# Patient Record
Sex: MALE | Race: WHITE | NOT HISPANIC OR LATINO | URBAN - METROPOLITAN AREA
[De-identification: names, ages, dates, MRNs, and addresses within clinical notes are randomized per-mention and may not be internally consistent; named-entity substitution may affect disease eponyms.]

---

## 2017-11-07 DIAGNOSIS — M17.11 PRIMARY OSTEOARTHRITIS OF RIGHT KNEE: Primary | ICD-10-CM

## 2017-11-07 NOTE — TELEPHONE ENCOUNTER
Phone Call:    Who did you talk to? (or) Who did you call? patient   Call Detail/Action: Emailed OPAL forms for [facility] and [facility] to pt -- [email or web address]

## 2017-11-07 NOTE — TELEPHONE ENCOUNTER
APPT INFO    Date /Time: 11/9/17 2pm   Reason for Appt: Rt Knee Possible TKA   Ref Provider/Clinic: Dr. Biggs   Are there internal records? If yes, list: no   Patient Contact (Y/N) & Call Details: Yes - LVM for pt to return call. Need OPAL forms   Action: --     OUTSIDE RECORDS CHECKLIST     CLINIC NAME COMMENTS REC (x) IMG (x)   [facility]      [facility]

## 2017-11-09 ENCOUNTER — PRE VISIT (OUTPATIENT)
Dept: ORTHOPEDICS | Facility: CLINIC | Age: 62
End: 2017-11-09

## 2017-11-09 ENCOUNTER — OFFICE VISIT (OUTPATIENT)
Dept: ORTHOPEDICS | Facility: CLINIC | Age: 62
End: 2017-11-09

## 2017-11-09 VITALS — WEIGHT: 212 LBS | HEIGHT: 72 IN | BODY MASS INDEX: 28.71 KG/M2

## 2017-11-09 DIAGNOSIS — M17.31 POST-TRAUMATIC OSTEOARTHRITIS OF RIGHT KNEE: Primary | ICD-10-CM

## 2017-11-09 RX ORDER — TRIAMCINOLONE ACETONIDE 40 MG/ML
40 INJECTION, SUSPENSION INTRA-ARTICULAR; INTRAMUSCULAR ONCE
Status: ACTIVE | OUTPATIENT
Start: 2017-11-09

## 2017-11-09 RX ORDER — LIDOCAINE HYDROCHLORIDE 10 MG/ML
2 INJECTION, SOLUTION INFILTRATION; PERINEURAL ONCE
Status: ACTIVE | OUTPATIENT
Start: 2017-11-09

## 2017-11-09 NOTE — LETTER
"11/9/2017       RE: Pb Morton  [address] [place] [address]     Dear Colleague,    Thank you for referring your patient, Pb Morton, to the [facility] at [facility]. Please see a copy of my visit note below.    Cc: right knee pain    HPI:  Pb is a known patient of ours who underwent a successful left medial uniarthroplasty in 2007. Unfortunately, over the last few months he has increased his walking and is having medial right knee pain. Rates it a consistent \"6 or 7\". Takes occasional Aleve which \"helps\". Denies mechanical symptoms. Has activity related swelling. No acute injuries. He did have a right knee arthscopy by Dr. Biggs in 2009 that \"helped the mechanical symptoms he was having\" and now it just \"hurts\". It is now at the point where it wakes him up at night.  He wears various types of over the counter braces that help \"keep me moving\".    PMH: Reviewed    ROS: Reviewed    FH/SH: Reviewed    Meds: Reviewed    All: Reviewed    PE:  Pleasant and cooperative male alert and oriented x3. Walks with a mild varus thrust on right lower extremity. Tender to palpate medial joint and nontender to lateral compartment. Minimal patella femoral crepitus without pain. Tracks appropriately. Strength symmetrical to contralateral side. No calf pain. No neuro deficits. Ligament exam is normal. RM -6-120.  Left knee reveals a well healed incision. No effusion. Stable one exam. ROM 0-126.     Xrays show a left medial uniarthroplasty without shifting or lucency or change in position.  Lateral compartment is well preserved. Right knee has end stage, grade IV,  bone on bone medial osteoarthritis with subchondral sclerosis. Full length reveals minimal leg length difference with -4 degrees of varus bilaterally.    Dx:  1. Left knee medial uniarthroplasty 2007  2. End stage right knee medial osteoarthritis, grade IV, bone on bone.    Plan:  1. Do nothing  2. " NSAIDS  3. Injections  4. Bracing  5. Surgery discussed scope, realignment, partial, and total knee replacements.    At this time, we will continue conservative treatments based on patients request. Recommended a cortisone shot today. Informed consent obtained.    Pre Procedure dx: right knee osteoarthritis    Post Procedure dx: right knee osteoarthritis    Procedure: Under sterile technique, the anterior lateral aspect of knee was prepped with chlorahexadine. 1cc of 40 mg of kenalog and 2 cc of lidocaine was injected into the lateral compartment without difficulty. Patient tolerated procedure well without reaction or complication.         Again, thank you for allowing me to participate in the care of your patient.      Sincerely,    IVON Browne CNP

## 2017-11-09 NOTE — MR AVS SNAPSHOT
After Visit Summary   2017    Pb Morton    MRN: [id number]           Patient Information     Date Of Birth          1955        Visit Information        Provider Department      2017 2:00 PM Nuzhat Berry, APRN CNP [facility]        Today's Diagnoses     Post-traumatic osteoarthritis of right knee    -  1       Follow-ups after your visit        Who to contact     Please call your clinic at [phone] to:    Ask questions about your health    Make or cancel appointments    Discuss your medicines    Learn about your test results    Speak to your doctor   If you have compliments or concerns about an experience at your clinic, or if you wish to file a complaint, please contact [facility] Patient Relations at [phone] or email us at [email or web address]         Additional Information About Your Visit        MyChart Information     [employer] is an electronic gateway that provides easy, online access to your medical records. With [employer], you can request a clinic appointment, read your test results, renew a prescription or communicate with your care team.     To sign up for [employer] visit the website at [email or web address]   You will be asked to enter the access code listed below, as well as some personal information. Please follow the directions to create your username and password.     Your access code is: [email or web address]  Expires: 2018  6:30 AM     Your access code will  in 90 days. If you need help or a new code, please contact your [facility] or call [phone] for assistance.        Care EveryWhere ID     This is your Care EveryWhere ID. This could be used by other organizations to access your [facility] medical records  [id number]        Your Vitals Were     Height BMI (Body Mass Index)                1.829 m (6') 28.75 kg/m2           Blood Pressure from Last 3  Encounters:   No data found for BP    Weight from Last 3 Encounters:   11/09/17 96.2 kg (212 lb)              We Performed the Following     Large Joint/Bursa injection and/or drainage - Unilateral (Shoulder, Knee) [20610]        Primary Care Provider    None Specified       No primary provider on file.        Equal Access to Services     MINNA [facility]FREDA : Hadii aad ku hadmargieo Somegali, waaxda luqadaha, qaybta kaalmada jillianyada, shayy lisaamparogenevieve collins . [facility] [phone].    ATENCIÓN: Si habla español, tiene a botello disposición servicios gratuitos de asistencia lingüística. Llame al [phone].    We comply with applicable federal civil rights laws and [place] laws. We do not discriminate on the basis of race, color, national origin, age, disability, sex, sexual orientation, or gender identity.            Thank you!     Thank you for choosing [facility]  for your care. Our goal is always to provide you with excellent care. Hearing back from our patients is one way we can continue to improve our services. Please take a few minutes to complete the written survey that you may receive in the mail after your visit with us. Thank you!             Your Updated Medication List - Protect others around you: Learn how to safely use, store and throw away your medicines at www.disposemymeds.org.          This list is accurate as of: 11/9/17  3:09 PM.  Always use your most recent med list.                   Brand Name Dispense Instructions for use Diagnosis    ALEVE PO           aspirin 81 MG tablet      Take by mouth daily        ATORVASTATIN CALCIUM PO           B-12 1000 MCG Caps           D3 DOTS 2000 UNITS Tbdp   Generic drug:  Cholecalciferol           FISH OIL PO           GLIPIZIDE PO           LISINOPRIL PO           METFORMIN HCL PO

## 2017-11-09 NOTE — LETTER
Date:November 10, 2017      Patient was self referred, no letter generated. Do not send.        [facility] Health Information

## 2017-11-09 NOTE — NURSING NOTE
[address] [address]  Dept: [phone]  ______________________________________________________________________________    Patient: Pb Morton   : 1955   MRN: [id number]   2017    INVASIVE PROCEDURE SAFETY CHECKLIST    Date: 2017   Procedure:Right knee cortisone injection  Patient Name: Pb Morton  MRN: [id number]  YOB: 1955    Action: Complete sections as appropriate. Any discrepancy results in a HARD COPY until resolved.     PRE PROCEDURE:  Patient ID verified with 2 identifiers (name and  or MRN): Yes  Procedure and site verified with patient/designee (when able): Yes  Accurate consent documentation in medical record: Yes  H&P (or appropriate assessment) documented in medical record: NA  H&P must be up to 20 days prior to procedure and updates within 24 hours of procedure as applicable: NA  Relevant diagnostic and radiology test results appropriately labeled and displayed as applicable: Yes  Procedure site(s) marked with provider initials: Yes    TIMEOUT:  Time-Out performed immediately prior to starting procedure, including verbal and active participation of all team members addressing the following:Yes  * Correct patient identify  * Confirmed that the correct side and site are marked  * An accurate procedure consent form  * Agreement on the procedure to be done  * Correct patient position  * Relevant images and results are properly labeled and appropriately displayed  * The need to administer antibiotics or fluids for irrigation purposes during the procedure as applicable   * Safety precautions based on patient history or medication use    DURING PROCEDURE: Verification of correct person, site, and procedures any time the responsibility for care of the patient is transferred to another member of the care team.     The following medication was given:   MEDICATION:  Kenalog 40 mg  ROUTE: intra  articular  SITE: right knee  DOSE: 1ml  LOT #: [id number]  : GL 2ours  EXPIRATION DATE: 03/19  NDC#: 1298-6503-36   Was there drug waste? No    MEDICATION:  Lidocaine without epinephrine  ROUTE: intra articular  SITE: right knee  DOSE: 2ml  LOT #: [id number]  : go2 media  EXPIRATION DATE: 07/21  NDC#: 40987-109-85   Was there drug waste? Yes  Amount of drug waste (mL): 18.  Reason for waste:  Single use vial      Onel Pineda, ATC  November 9, 2017

## 2017-11-09 NOTE — PROGRESS NOTES
"Cc: right knee pain    HPI:  Pb is a known patient of ours who underwent a successful left medial uniarthroplasty in 2007. Unfortunately, over the last few months he has increased his walking and is having medial right knee pain. Rates it a consistent \"6 or 7\". Takes occasional Aleve which \"helps\". Denies mechanical symptoms. Has activity related swelling. No acute injuries. He did have a right knee arthscopy by Dr. Biggs in 2009 that \"helped the mechanical symptoms he was having\" and now it just \"hurts\". It is now at the point where it wakes him up at night.  He wears various types of over the counter braces that help \"keep me moving\".    PMH: Reviewed    ROS: Reviewed    FH/SH: Reviewed    Meds: Reviewed    All: Reviewed    PE:  Pleasant and cooperative male alert and oriented x3. Walks with a mild varus thrust on right lower extremity. Tender to palpate medial joint and nontender to lateral compartment. Minimal patella femoral crepitus without pain. Tracks appropriately. Strength symmetrical to contralateral side. No calf pain. No neuro deficits. Ligament exam is normal. RM -6-120.  Left knee reveals a well healed incision. No effusion. Stable one exam. ROM 0-126.     Xrays show a left medial uniarthroplasty without shifting or lucency or change in position.  Lateral compartment is well preserved. Right knee has end stage, grade IV,  bone on bone medial osteoarthritis with subchondral sclerosis. Full length reveals minimal leg length difference with -4 degrees of varus bilaterally.    Dx:  1. Left knee medial uniarthroplasty 2007  2. End stage right knee medial osteoarthritis, grade IV, bone on bone.    Plan:  1. Do nothing  2. NSAIDS  3. Injections  4. Bracing  5. Surgery discussed scope, realignment, partial, and total knee replacements.    At this time, we will continue conservative treatments based on patients request. Recommended a cortisone shot today. Informed consent obtained.    Pre Procedure dx: " right knee osteoarthritis    Post Procedure dx: right knee osteoarthritis    Procedure: Under sterile technique, the anterior lateral aspect of knee was prepped with chlorahexadine. 1cc of 40 mg of kenalog and 2 cc of lidocaine was injected into the lateral compartment without difficulty. Patient tolerated procedure well without reaction or complication.

## 2017-11-09 NOTE — TELEPHONE ENCOUNTER
Records Received From: [facility]     Date/Exam/Location  (specify location if different)   Office Notes:  1/27/09, 12/15/08, 5/15/07(on left knee), 4/13/07, 3/27/07(on left knee), 12/28/06   Radiology Reports: - right knee xray 12/15/08(note within records)  - MRI right 12/4/08(note within records)  - xray lower ext bilateral 4/13/07(note within records), 12/28/06(note within records)  - xray left knee 3/12/07- Cecilia Jackson Notified (Y/N):    Procedure Notes:  (include injections) - right knee injection 12/15/08  - right knee arthroscopy on 1/30/09 Dr. Biggs- [facility]   - left knee op-note 3/12/07

## 2017-11-10 ENCOUNTER — TELEPHONE (OUTPATIENT)
Dept: ORTHOPEDICS | Facility: CLINIC | Age: 62
End: 2017-11-10

## 2017-11-10 NOTE — TELEPHONE ENCOUNTER
Incidental radiology finding noted on xray of Rt. Knee.  Item #5 states a Rt. Tibial lucency noted.  Recommendations in report.  I will forward this to Nuzhat.

## 2020-02-25 DIAGNOSIS — M25.561 RIGHT KNEE PAIN: Primary | ICD-10-CM

## 2020-02-27 ENCOUNTER — OFFICE VISIT (OUTPATIENT)
Dept: ORTHOPEDICS | Facility: CLINIC | Age: 65
End: 2020-02-27
Payer: COMMERCIAL

## 2020-02-27 ENCOUNTER — DOCUMENTATION ONLY (OUTPATIENT)
Dept: CARE COORDINATION | Facility: CLINIC | Age: 65
End: 2020-02-27

## 2020-02-27 ENCOUNTER — ANCILLARY PROCEDURE (OUTPATIENT)
Dept: GENERAL RADIOLOGY | Facility: CLINIC | Age: 65
End: 2020-02-27
Attending: NURSE PRACTITIONER
Payer: COMMERCIAL

## 2020-02-27 VITALS — WEIGHT: 209.52 LBS | HEIGHT: 71 IN | BODY MASS INDEX: 29.33 KG/M2

## 2020-02-27 DIAGNOSIS — M25.561 RIGHT KNEE PAIN: ICD-10-CM

## 2020-02-27 DIAGNOSIS — M17.31 POST-TRAUMATIC OSTEOARTHRITIS OF RIGHT KNEE: Primary | ICD-10-CM

## 2020-02-27 RX ORDER — LISINOPRIL 20 MG/1
20 TABLET ORAL
COMMUNITY
Start: 2019-11-25

## 2020-02-27 RX ORDER — PERPHENAZINE 16 MG
TABLET ORAL
COMMUNITY
Start: 2019-01-14

## 2020-02-27 RX ORDER — PEN NEEDLE, DIABETIC 31 GX5/16"
NEEDLE, DISPOSABLE MISCELLANEOUS
COMMUNITY
Start: 2019-03-18

## 2020-02-27 RX ORDER — GABAPENTIN 300 MG/1
300 CAPSULE ORAL
COMMUNITY
Start: 2019-08-15 | End: 2020-08-14

## 2020-02-27 RX ORDER — LANCETS 30 GAUGE
1 EACH MISCELLANEOUS
COMMUNITY
Start: 2019-03-18

## 2020-02-27 RX ORDER — DULAGLUTIDE 1.5 MG/.5ML
INJECTION, SOLUTION SUBCUTANEOUS
COMMUNITY
Start: 2020-02-25

## 2020-02-27 RX ORDER — OMEGA-3 FATTY ACIDS/FISH OIL 300-1000MG
2 CAPSULE ORAL
COMMUNITY

## 2020-02-27 RX ORDER — ASCORBIC ACID 500 MG
500 TABLET ORAL
COMMUNITY
Start: 2019-01-14

## 2020-02-27 RX ORDER — NAPROXEN SODIUM 220 MG
220 TABLET ORAL
COMMUNITY

## 2020-02-27 RX ORDER — AMLODIPINE BESYLATE 5 MG/1
TABLET ORAL
COMMUNITY
Start: 2020-02-26

## 2020-02-27 RX ORDER — GLIPIZIDE 10 MG/1
10 TABLET, FILM COATED, EXTENDED RELEASE ORAL
COMMUNITY
Start: 2019-11-25 | End: 2020-11-24

## 2020-02-27 RX ADMIN — TRIAMCINOLONE ACETONIDE 40 MG: 40 INJECTION, SUSPENSION INTRA-ARTICULAR; INTRAMUSCULAR at 14:48

## 2020-02-27 RX ADMIN — LIDOCAINE HYDROCHLORIDE 2 ML: 10 INJECTION, SOLUTION EPIDURAL; INFILTRATION; INTRACAUDAL; PERINEURAL at 14:48

## 2020-02-27 ASSESSMENT — MIFFLIN-ST. JEOR: SCORE: 1752.51

## 2020-02-27 ASSESSMENT — PATIENT HEALTH QUESTIONNAIRE - PHQ9
SUM OF ALL RESPONSES TO PHQ QUESTIONS 1-9: 6
10. IF YOU CHECKED OFF ANY PROBLEMS, HOW DIFFICULT HAVE THESE PROBLEMS MADE IT FOR YOU TO DO YOUR WORK, TAKE CARE OF THINGS AT HOME, OR GET ALONG WITH OTHER PEOPLE: NOT DIFFICULT AT ALL
SUM OF ALL RESPONSES TO PHQ QUESTIONS 1-9: 6

## 2020-02-27 NOTE — LETTER
Date:February 28, 2020      Patient was self referred, no letter generated. Do not send.        [facility] Health Information

## 2020-02-27 NOTE — NURSING NOTE
"Reason For Visit:   Chief Complaint   Patient presents with     RECHECK     right knee pain and left knee lack of ROM        Ht 1.803 m (5' 11\")   Wt 95 kg (209 lb 8.3 oz)   BMI 29.22 kg/m           Frida Main ATC    "

## 2020-02-27 NOTE — LETTER
"2/27/2020       RE: Pb Morton  [address] [place] [address]     Dear Colleague,    Thank you for referring your patient, Pb Morton, to the [facility] at [facility]. Please see a copy of my visit note below.    CC: \"right knee pain\"    HPI:  Pb is seen today for continued worsening right medial knee pain. He localizes the pain \"as all on the inside\". He denies recent injuries, falls, or trauma. Rates his pain \"7 or 8\". He has activity related night pain. The swelling is \"pretty consistent and worsens with more walking\". He denies mechanical symptoms such as locking or catching. His last cortisone injection was given 11/2017 that \"helped for years\". He is unable to take oral NSAIDS because of his inconsistent hypertension that is being managed and treated by his primary MD. He has tried over the counter CBD oil that did provide relief.    His left knee medial uniarthroplasty was done 13 years ago and he continues to report complete pain relief on the inside of the knee that he had before the surgery. He has intermittent \"achiness\" to his left knee but is unable to localize where. He has done antibiotics for all dental procedures. He denies any instability or mechanical symptoms and it does not swell.    PMH: Reviewed from patient chart today 2/27/2020    ROS: Reviewed from patient tablet today 2/27/2020 with all systems negative except for those listed below.    PE:  Pleasant and cooperative male alert and oriented x3. He is 5'11\" and weighs 209 pounds with a BMI of 29. His right knee has significant tenderness to medial joint line with varus deformity. Quad strength 5/5. Mild joint effusion noted without pain posteriorly. No calf pain. Negative lachmans exam. Minimal pseudolaxity in valgus/varus stress at 0, 45, and 90 degrees.  ROM:0-118.     Xrays taken today show no signfiicant change in position of the left knee medial " "uniarthroplasty with a slight increase in the narrowing of the lateral joint when compared to previous imaging. His right knee has significant end stage, bone on bone, medial osteoarthritis with subchondral edema noted. Lateral compartment is well preserved with chondrocalcinosis noted.  Full length show minimal leg length difference with varus deformity to bilateral lower extremities right/left of -10/-2.    Dx:  1. Left knee partial medial uniarthroplasty  2. End stage, grade IV, severe right knee medial osteoarthritis    Plan:  1. The natural plan of care and treatment options were discussed in managing knee arthritis symptoms. Continued conservative options include ice, rest, nsaids, and injections. He is unable to now take any oral NSAIDS per his primary care MD for \"his uncontrolled hypertension\". I offered him a cortisone shot today and will prescribe voltaren gel to apply 4 times a day as needed for knee pain.  2. Follow up prn.    Total time spent was 30 minutes with greater tan 50% spent in face to face consultation and collaboration of care.    Procedure note     Preprocedure diagnosis: Right knee osteoarthritis     Post procedure diagnosis: Right knee osteoarthritis     Procedure:     Informed consent was obtained.  Using sterile technique, the anterior lateral aspect of the right knee was prepped with chlorhexidine x2.  1 cc of 40 mg of Kenalog and 2 cc of lidocaine were injected in the lateral joint space without difficulty.  Patient tolerated procedure well        Again, thank you for allowing me to participate in the care of your patient.      Sincerely,    Nuzhat Berry, IVON CNP      "

## 2020-02-27 NOTE — PROGRESS NOTES
"CC: \"right knee pain\"    HPI:  Pb is seen today for continued worsening right medial knee pain. He localizes the pain \"as all on the inside\". He denies recent injuries, falls, or trauma. Rates his pain \"7 or 8\". He has activity related night pain. The swelling is \"pretty consistent and worsens with more walking\". He denies mechanical symptoms such as locking or catching. His last cortisone injection was given 11/2017 that \"helped for years\". He is unable to take oral NSAIDS because of his inconsistent hypertension that is being managed and treated by his primary MD. He has tried over the counter CBD oil that did provide relief.    His left knee medial uniarthroplasty was done 13 years ago and he continues to report complete pain relief on the inside of the knee that he had before the surgery. He has intermittent \"achiness\" to his left knee but is unable to localize where. He has done antibiotics for all dental procedures. He denies any instability or mechanical symptoms and it does not swell.    PMH: Reviewed from patient chart today 2/27/2020    ROS: Reviewed from patient tablet today 2/27/2020 with all systems negative except for those listed below.    PE:  Pleasant and cooperative male alert and oriented x3. He is 5'11\" and weighs 209 pounds with a BMI of 29. His right knee has significant tenderness to medial joint line with varus deformity. Quad strength 5/5. Mild joint effusion noted without pain posteriorly. No calf pain. Negative lachmans exam. Minimal pseudolaxity in valgus/varus stress at 0, 45, and 90 degrees.  ROM:0-118.     Xrays taken today show no signfiicant change in position of the left knee medial uniarthroplasty with a slight increase in the narrowing of the lateral joint when compared to previous imaging. His right knee has significant end stage, bone on bone, medial osteoarthritis with subchondral edema noted. Lateral compartment is well preserved with chondrocalcinosis noted.  Full length " "show minimal leg length difference with varus deformity to bilateral lower extremities right/left of -10/-2.    Dx:  1. Left knee partial medial uniarthroplasty  2. End stage, grade IV, severe right knee medial osteoarthritis    Plan:  1. The natural plan of care and treatment options were discussed in managing knee arthritis symptoms. Continued conservative options include ice, rest, nsaids, and injections. He is unable to now take any oral NSAIDS per his primary care MD for \"his uncontrolled hypertension\". I offered him a cortisone shot today and will prescribe voltaren gel to apply 4 times a day as needed for knee pain.  2. Follow up prn.    Total time spent was 30 minutes with greater tan 50% spent in face to face consultation and collaboration of care.    Procedure note     Preprocedure diagnosis: Right knee osteoarthritis     Post procedure diagnosis: Right knee osteoarthritis     Procedure:     Informed consent was obtained.  Using sterile technique, the anterior lateral aspect of the right knee was prepped with chlorhexidine x2.  1 cc of 40 mg of Kenalog and 2 cc of lidocaine were injected in the lateral joint space without difficulty.  Patient tolerated procedure well      "

## 2020-02-28 ASSESSMENT — PATIENT HEALTH QUESTIONNAIRE - PHQ9: SUM OF ALL RESPONSES TO PHQ QUESTIONS 1-9: 6

## 2020-03-11 ENCOUNTER — HEALTH MAINTENANCE LETTER (OUTPATIENT)
Age: 65
End: 2020-03-11

## 2020-03-16 RX ORDER — TRIAMCINOLONE ACETONIDE 40 MG/ML
40 INJECTION, SUSPENSION INTRA-ARTICULAR; INTRAMUSCULAR
Status: SHIPPED | OUTPATIENT
Start: 2020-02-27

## 2020-03-16 RX ORDER — LIDOCAINE HYDROCHLORIDE 10 MG/ML
2 INJECTION, SOLUTION EPIDURAL; INFILTRATION; INTRACAUDAL; PERINEURAL
Status: SHIPPED | OUTPATIENT
Start: 2020-02-27

## 2020-03-16 NOTE — PROGRESS NOTES
Large Joint Injection/Arthocentesis: R knee joint    Date/Time: 2020 2:48 PM  Performed by: Nuzhat Berry APRN CNP  Authorized by: Nuzhat Berry APRN CNP     Indications:  Pain  Needle Size:  25 G  Guidance: landmark guided    Location:  Knee      Medications:  2 mL lidocaine (PF) 1 %; 40 mg triamcinolone 40 MG/ML  Outcome:  Tolerated well, no immediate complications  Procedure discussed: discussed risks, benefits, and alternatives    Consent Given by:  Patient  Timeout: timeout called immediately prior to procedure    Prep: patient was prepped and draped in usual sterile fashion          [facility] [place] [address] [address] [address]  [phone]  Dept: [phone]  ______________________________________________________________________________    Patient: Pb Morton   : 1955   MRN: [id number]   2020    INVASIVE PROCEDURE SAFETY CHECKLIST    Date: 3/16/2020   Procedure:Right knee cortisone injection   Patient Name: bP Morton  MRN: [id number]  YOB: 1955    Action: Complete sections as appropriate. Any discrepancy results in a HARD COPY until resolved.     PRE PROCEDURE:  Patient ID verified with 2 identifiers (name and  or MRN): Yes  Procedure and site verified with patient/designee (when able): Yes  Accurate consent documentation in medical record: Yes  H&P (or appropriate assessment) documented in medical record: NA  H&P must be up to 20 days prior to procedure and updates within 24 hours of procedure as applicable: NA  Relevant diagnostic and radiology test results appropriately labeled and displayed as applicable: Yes  Procedure site(s) marked with provider initials: Yes    TIMEOUT:  Time-Out performed immediately prior to starting procedure, including verbal and active participation of all team members addressing the following:Yes  * Correct patient identify  * Confirmed that the correct side and site are  marked  * An accurate procedure consent form  * Agreement on the procedure to be done  * Correct patient position  * Relevant images and results are properly labeled and appropriately displayed  * The need to administer antibiotics or fluids for irrigation purposes during the procedure as applicable   * Safety precautions based on patient history or medication use    DURING PROCEDURE: Verification of correct person, site, and procedures any time the responsibility for care of the patient is transferred to another member of the care team.       The following medications were given:         Prior to injection, verified patient identity using patient's name and date of birth.  Due to injection administration, patient instructed to remain in clinic for 15 minutes  afterwards, and to report any adverse reaction to me immediately.    Joint injection was performed.    Medication Name: Lidocaine  Drug Amount Wasted:  Yes: 3 mg/ml   Vial/Syringe: Single dose vial  Expiration Date:  7/1/22    Medication Name Kenalog NDC [address]-9    Scribed by Frida Main ATC for Nuzhat Berry on March 16, 2020 based on the provider's   statements to me.     Frida Main ATC

## 2020-08-17 DIAGNOSIS — M17.31 POST-TRAUMATIC OSTEOARTHRITIS OF RIGHT KNEE: Primary | ICD-10-CM

## 2020-08-20 ENCOUNTER — VIRTUAL VISIT (OUTPATIENT)
Dept: ORTHOPEDICS | Facility: CLINIC | Age: 65
End: 2020-08-20
Payer: COMMERCIAL

## 2020-08-20 ENCOUNTER — ANCILLARY PROCEDURE (OUTPATIENT)
Dept: GENERAL RADIOLOGY | Facility: CLINIC | Age: 65
End: 2020-08-20
Attending: NURSE PRACTITIONER
Payer: COMMERCIAL

## 2020-08-20 DIAGNOSIS — M17.31 POST-TRAUMATIC OSTEOARTHRITIS OF RIGHT KNEE: ICD-10-CM

## 2020-08-20 DIAGNOSIS — M17.11 PRIMARY OSTEOARTHRITIS OF RIGHT KNEE: Primary | ICD-10-CM

## 2020-08-20 NOTE — PROGRESS NOTES
"CC: \"right knee pain after a fall\"    HPI:  Pb is contacted today via telephone to discuss his right knee pain. About 10 days ago, Pb was golfing and slid on a wooden bridge and \"hyperextended his right knee\". He did not hear or feel any snap or pop He was able to weight bear immediately and in fact finished his golf round.He did not have any immediate edema. The next morning his knee felt \"a little stiff and tight on the inside part of his knee and around to the back\". He has iced, used heat, lidocaine gel, over the counter aleve and has been wearing a compression sleeve type of brace. Over the past 10 days, his knee pain has \"gotten at least 85% better\". He has occasional swelling and pain to the back of his knee. He denies any changes in his skin and no lacerations or bruising after the fall. Rates his knee pain \"3 or 4\". It does not wake up him at night.      He has a history of a left knee medial uniarthroplasty done by Dr Biggs in 2007 with good results. He continues to report his left knee \"is perfect\".       PMH: Reviewed from patient today 8/20/2020    ROS: Reviewed from patient tablet today 8/20/2020    PE:  Telephone visit was done today. He says his skin is intact. He denies any \"noticeable swelling\". He does have some swelling behind his knee. His range of motion is \"very similar to the other side with slight discomfort when it is fully straight\". He denies any numbness or tingling.    Xrays were taken today and reviewed by myself with the radiologist read as below:    Impression:  1. No acute osseous abnormality.  2. Severe right knee medial compartment predominant osteoarthritis.  3. Nonspecific focal lucencies in the right proximal tibia and fibula,  stable since 2017, therefore likely benign in etiology.    Dx:  1. Stable left knee medial uniarthroplasty  2. Right knee osteoarthritis with recent fall    Plan:  1. I reassured Pb that his xrays all look unremarkable for any bony injury or " fracture after his injury. Explained his level of arthritis on the xray and gave him suggestions in how to manage these symptoms including nsaids, bracing, ice, activity modifications, and injections.  2. At this time, he feels his symptoms are improving and no immediate treatment is necessary. He will call us if symptoms would change including any locking or catching symptoms or if just increased pain where we could consider cortisone injection.    Total time spent was 20 minutes via telephone.

## 2020-08-20 NOTE — LETTER
Date:August 21, 2020      Patient was self referred, no letter generated. Do not send.        [facility] Health Information

## 2020-08-20 NOTE — LETTER
"    8/20/2020         RE: Pb Morton  [address] [place] [address]        Dear Colleague,    Thank you for referring your patient, Pb Morton, to the [facility]. Please see a copy of my visit note below.    CC: \"right knee pain after a fall\"    HPI:  Pb is contacted today via telephone to discuss his right knee pain. About 10 days ago, Pb was golfing and slid on a wooden bridge and \"hyperextended his right knee\". He did not hear or feel any snap or pop He was able to weight bear immediately and in fact finished his golf round.He did not have any immediate edema. The next morning his knee felt \"a little stiff and tight on the inside part of his knee and around to the back\". He has iced, used heat, lidocaine gel, over the counter aleve and has been wearing a compression sleeve type of brace. Over the past 10 days, his knee pain has \"gotten at least 85% better\". He has occasional swelling and pain to the back of his knee. He denies any changes in his skin and no lacerations or bruising after the fall. Rates his knee pain \"3 or 4\". It does not wake up him at night.      He has a history of a left knee medial uniarthroplasty done by Dr Biggs in 2007 with good results. He continues to report his left knee \"is perfect\".       PMH: Reviewed from patient today 8/20/2020    ROS: Reviewed from patient tablet today 8/20/2020    PE:  Telephone visit was done today. He says his skin is intact. He denies any \"noticeable swelling\". He does have some swelling behind his knee. His range of motion is \"very similar to the other side with slight discomfort when it is fully straight\". He denies any numbness or tingling.    Xrays were taken today and reviewed by myself with the radiologist read as below:    Impression:  1. No acute osseous abnormality.  2. Severe right knee medial compartment predominant osteoarthritis.  3. Nonspecific focal lucencies in the right proximal tibia and " fibula,  stable since 2017, therefore likely benign in etiology.    Dx:  1. Stable left knee medial uniarthroplasty  2. Right knee osteoarthritis with recent fall    Plan:  1. I reassured Pb that his xrays all look unremarkable for any bony injury or fracture after his injury. Explained his level of arthritis on the xray and gave him suggestions in how to manage these symptoms including nsaids, bracing, ice, activity modifications, and injections.  2. At this time, he feels his symptoms are improving and no immediate treatment is necessary. He will call us if symptoms would change including any locking or catching symptoms or if just increased pain where we could consider cortisone injection.    Total time spent was 20 minutes via telephone.    Again, thank you for allowing me to participate in the care of your patient.        Sincerely,        IVON Browne CNP

## 2020-12-27 ENCOUNTER — HEALTH MAINTENANCE LETTER (OUTPATIENT)
Age: 65
End: 2020-12-27

## 2021-03-12 NOTE — TELEPHONE ENCOUNTER
RECORDS RECEIVED FROM: Requesting a cortisone shot in (L) knee/[employer]   DATE RECEIVED: Mar 15, 2021     NOTES STATUS DETAILS   03/12/21   7:19 AM   RETURNING PATIENT  Ilda Nelson CMA

## 2021-03-14 ASSESSMENT — KOOS JR: HOW SEVERE IS YOUR KNEE STIFFNESS AFTER FIRST WAKING IN MORNING: MILD

## 2021-03-14 ASSESSMENT — ENCOUNTER SYMPTOMS
MYALGIAS: 1
ARTHRALGIAS: 1
BACK PAIN: 1

## 2021-03-14 ASSESSMENT — ACTIVITIES OF DAILY LIVING (ADL): FUNCTION,_DAILY_LIVING_SCORE: 75

## 2021-03-15 ENCOUNTER — OFFICE VISIT (OUTPATIENT)
Dept: ORTHOPEDICS | Facility: CLINIC | Age: 66
End: 2021-03-15
Payer: COMMERCIAL

## 2021-03-15 ENCOUNTER — PRE VISIT (OUTPATIENT)
Dept: ORTHOPEDICS | Facility: CLINIC | Age: 66
End: 2021-03-15

## 2021-03-15 DIAGNOSIS — M17.11 PRIMARY OSTEOARTHRITIS OF RIGHT KNEE: Primary | ICD-10-CM

## 2021-03-15 PROCEDURE — 20610 DRAIN/INJ JOINT/BURSA W/O US: CPT | Mod: RT | Performed by: NURSE PRACTITIONER

## 2021-03-15 PROCEDURE — 99207 PR DROP WITH A PROCEDURE: CPT | Performed by: NURSE PRACTITIONER

## 2021-03-15 RX ADMIN — TRIAMCINOLONE ACETONIDE 40 MG: 40 INJECTION, SUSPENSION INTRA-ARTICULAR; INTRAMUSCULAR at 13:40

## 2021-03-15 RX ADMIN — LIDOCAINE HYDROCHLORIDE 2 ML: 10 INJECTION, SOLUTION EPIDURAL; INFILTRATION; INTRACAUDAL; PERINEURAL at 13:40

## 2021-03-15 NOTE — LETTER
3/15/2021         RE: Pb Morton  [address]  [place] [address]        Dear Colleague,    Thank you for referring your patient, Pb Morton, to the [facility]. Please see a copy of my visit note below.    Procedure note     Preprocedure diagnosis: Right knee osteoarthritis     Post procedure diagnosis: Right knee osteoarthritis     Procedure:     Informed consent was obtained.  Using sterile technique, the anterior lateral aspect of the right knee was prepped with chlorhexidine x2.  1 cc of 40 mg of Kenalog and 2 cc of lidocaine were injected in the lateral joint space without difficulty.  Patient tolerated procedure well    Nuzhat BOSCH APRN, CNP, agree with above documentation.    Large Joint Injection/Arthocentesis: R knee joint    Date/Time: 3/15/2021 1:40 PM  Performed by: Nuzhat Berry APRN CNP  Authorized by: Nuzhat Berry APRN CNP     Indications:  Pain  Needle Size:  25 G  Guidance: landmark guided    Location:  Knee      Medications:  40 mg triamcinolone 40 MG/ML; 2 mL lidocaine (PF) 1 %  Outcome:  Tolerated well, no immediate complications  Procedure discussed: discussed risks, benefits, and alternatives    Consent Given by:  Patient  Timeout: timeout called immediately prior to procedure    Prep: patient was prepped and draped in usual sterile fashion            [facility] [address] [address] [phone]  Dept: [phone]  ______________________________________________________________________________    Patient: Pb Morton   : 1955   MRN: [id number]   March 15, 2021    INVASIVE PROCEDURE SAFETY CHECKLIST    Date: 3/15/2021   Procedure:Right knee cortisone injection  Patient Name: Pb Morton  MRN: [id number]  YOB: 1955    Action: Complete sections as appropriate. Any discrepancy results in a HARD COPY until resolved.      PRE PROCEDURE:  Patient ID verified with 2 identifiers (name and  or MRN): Yes  Procedure and site verified with patient/designee (when able): Yes  Accurate consent documentation in medical record: Yes  H&P (or appropriate assessment) documented in medical record: NA  H&P must be up to 20 days prior to procedure and updates within 24 hours of procedure as applicable: NA  Relevant diagnostic and radiology test results appropriately labeled and displayed as applicable: Yes  Procedure site(s) marked with provider initials: Yes    TIMEOUT:  Time-Out performed immediately prior to starting procedure, including verbal and active participation of all team members addressing the following:Yes  * Correct patient identify  * Confirmed that the correct side and site are marked  * An accurate procedure consent form  * Agreement on the procedure to be done  * Correct patient position  * Relevant images and results are properly labeled and appropriately displayed  * The need to administer antibiotics or fluids for irrigation purposes during the procedure as applicable   * Safety precautions based on patient history or medication use    DURING PROCEDURE: Verification of correct person, site, and procedures any time the responsibility for care of the patient is transferred to another member of the care team.       The following medications were given:         Prior to injection, verified patient identity using patient's name and date of birth.  Due to injection administration, patient instructed to remain in clinic for 15 minutes  afterwards, and to report any adverse reaction to me immediately.    Joint injection was performed.    Medication Name: Lidocaine NDC 84065-816-60  Drug Amount Wasted:  Yes: 3 mg/ml   Vial/Syringe: Single dose vial  Expiration Date:      Medication Name Kenalog NDC [address]-8    Scribed by Frida Main ATC for Nuzhat Berry on March 15, 2021 at 1340 based on the provider's statements to  me.     Frida Main, [facility]        Answers for HPI/ROS submitted by the patient on 3/14/2021   General Symptoms: No  Skin Symptoms: No  HENT Symptoms: No  EYE SYMPTOMS: No  HEART SYMPTOMS: No  LUNG SYMPTOMS: No  INTESTINAL SYMPTOMS: No  URINARY SYMPTOMS: No  REPRODUCTIVE SYMPTOMS: No  SKELETAL SYMPTOMS: Yes  BLOOD SYMPTOMS: No  NERVOUS SYSTEM SYMPTOMS: No  MENTAL HEALTH SYMPTOMS: No  Back pain: Yes  Muscle aches: Yes  Joint pain: Yes        Again, thank you for allowing me to participate in the care of your patient.        Sincerely,        IVON Browne CNP

## 2021-03-15 NOTE — NURSING NOTE
Reason For Visit:   Chief Complaint   Patient presents with     RECHECK     right knee cortisone injection       There were no vitals taken for this visit.    Pain Assessment  Patient Currently in Pain: Yes  Primary Pain Location: Knee    Frida Main ATC

## 2021-03-15 NOTE — PROGRESS NOTES
Procedure note     Preprocedure diagnosis: Right knee osteoarthritis     Post procedure diagnosis: Right knee osteoarthritis     Procedure:     Informed consent was obtained.  Using sterile technique, the anterior lateral aspect of the right knee was prepped with chlorhexidine x2.  1 cc of 40 mg of Kenalog and 2 cc of lidocaine were injected in the lateral joint space without difficulty.  Patient tolerated procedure well    Nuzhat BOSCH APRN, CNP, agree with above documentation.    Large Joint Injection/Arthocentesis: R knee joint    Date/Time: 3/15/2021 1:40 PM  Performed by: Nuzhat Berry APRN CNP  Authorized by: Nuzhat Berry APRN CNP     Indications:  Pain  Needle Size:  25 G  Guidance: landmark guided    Location:  Knee      Medications:  40 mg triamcinolone 40 MG/ML; 2 mL lidocaine (PF) 1 %  Outcome:  Tolerated well, no immediate complications  Procedure discussed: discussed risks, benefits, and alternatives    Consent Given by:  Patient  Timeout: timeout called immediately prior to procedure    Prep: patient was prepped and draped in usual sterile fashion            [facility] [address] [address] [phone]  Dept: [phone]  ______________________________________________________________________________    Patient: Pb Morton   : 1955   MRN: [id number]   March 15, 2021    INVASIVE PROCEDURE SAFETY CHECKLIST    Date: 3/15/2021   Procedure:Right knee cortisone injection  Patient Name: Pb Morton  MRN: [id number]  YOB: 1955    Action: Complete sections as appropriate. Any discrepancy results in a HARD COPY until resolved.     PRE PROCEDURE:  Patient ID verified with 2 identifiers (name and  or MRN): Yes  Procedure and site verified with patient/designee (when able): Yes  Accurate consent documentation in medical record: Yes  H&P (or appropriate assessment) documented in medical  record: NA  H&P must be up to 20 days prior to procedure and updates within 24 hours of procedure as applicable: NA  Relevant diagnostic and radiology test results appropriately labeled and displayed as applicable: Yes  Procedure site(s) marked with provider initials: Yes    TIMEOUT:  Time-Out performed immediately prior to starting procedure, including verbal and active participation of all team members addressing the following:Yes  * Correct patient identify  * Confirmed that the correct side and site are marked  * An accurate procedure consent form  * Agreement on the procedure to be done  * Correct patient position  * Relevant images and results are properly labeled and appropriately displayed  * The need to administer antibiotics or fluids for irrigation purposes during the procedure as applicable   * Safety precautions based on patient history or medication use    DURING PROCEDURE: Verification of correct person, site, and procedures any time the responsibility for care of the patient is transferred to another member of the care team.       The following medications were given:         Prior to injection, verified patient identity using patient's name and date of birth.  Due to injection administration, patient instructed to remain in clinic for 15 minutes  afterwards, and to report any adverse reaction to me immediately.    Joint injection was performed.    Medication Name: Lidocaine NDC 93686-439-41  Drug Amount Wasted:  Yes: 3 mg/ml   Vial/Syringe: Single dose vial  Expiration Date:  07/24    Medication Name Kenalog NDC [address]-5    Scribed by Frida Main ATC for Nuzhat Berry on March 15, 2021 at 1340 based on the provider's statements to me.     Frida Main ATC        Answers for HPI/ROS submitted by the patient on 3/14/2021   General Symptoms: No  Skin Symptoms: No  HENT Symptoms: No  EYE SYMPTOMS: No  HEART SYMPTOMS: No  LUNG SYMPTOMS: No  INTESTINAL SYMPTOMS: No  URINARY SYMPTOMS:  No  REPRODUCTIVE SYMPTOMS: No  SKELETAL SYMPTOMS: Yes  BLOOD SYMPTOMS: No  NERVOUS SYSTEM SYMPTOMS: No  MENTAL HEALTH SYMPTOMS: No  Back pain: Yes  Muscle aches: Yes  Joint pain: Yes

## 2021-03-15 NOTE — LETTER
Date:March 17, 2021      Patient was self referred, no letter generated. Do not send.        [facility] Information

## 2021-03-16 RX ORDER — TRIAMCINOLONE ACETONIDE 40 MG/ML
40 INJECTION, SUSPENSION INTRA-ARTICULAR; INTRAMUSCULAR
Status: SHIPPED | OUTPATIENT
Start: 2021-03-15

## 2021-03-16 RX ORDER — LIDOCAINE HYDROCHLORIDE 10 MG/ML
2 INJECTION, SOLUTION EPIDURAL; INFILTRATION; INTRACAUDAL; PERINEURAL
Status: SHIPPED | OUTPATIENT
Start: 2021-03-15

## 2021-03-26 ENCOUNTER — RECORDS - HEALTHEAST (OUTPATIENT)
Dept: LAB | Facility: CLINIC | Age: 66
End: 2021-03-26

## 2021-03-26 LAB
ANION GAP SERPL CALCULATED.3IONS-SCNC: 13 MMOL/L (ref 5–18)
BUN SERPL-MCNC: 8 MG/DL (ref 8–22)
CALCIUM SERPL-MCNC: 9.4 MG/DL (ref 8.5–10.5)
CHLORIDE BLD-SCNC: 104 MMOL/L (ref 98–107)
CHOLEST SERPL-MCNC: 156 MG/DL
CO2 SERPL-SCNC: 24 MMOL/L (ref 22–31)
CREAT SERPL-MCNC: 0.99 MG/DL (ref 0.7–1.3)
FASTING STATUS PATIENT QL REPORTED: YES
GFR SERPL CREATININE-BSD FRML MDRD: >60 ML/MIN/1.73M2
GLUCOSE BLD-MCNC: 165 MG/DL (ref 70–125)
HDLC SERPL-MCNC: 59 MG/DL
LDLC SERPL CALC-MCNC: 85 MG/DL
POTASSIUM BLD-SCNC: 4.6 MMOL/L (ref 3.5–5)
PSA SERPL-MCNC: 1.7 NG/ML (ref 0–4.5)
SODIUM SERPL-SCNC: 141 MMOL/L (ref 136–145)
TRIGL SERPL-MCNC: 62 MG/DL

## 2021-04-25 ENCOUNTER — HEALTH MAINTENANCE LETTER (OUTPATIENT)
Age: 66
End: 2021-04-25

## 2021-05-30 ENCOUNTER — RECORDS - HEALTHEAST (OUTPATIENT)
Dept: ADMINISTRATIVE | Facility: CLINIC | Age: 66
End: 2021-05-30

## 2021-10-09 ENCOUNTER — HEALTH MAINTENANCE LETTER (OUTPATIENT)
Age: 66
End: 2021-10-09

## 2022-02-04 ENCOUNTER — LAB REQUISITION (OUTPATIENT)
Dept: LAB | Facility: CLINIC | Age: 67
End: 2022-02-04
Payer: COMMERCIAL

## 2022-02-04 DIAGNOSIS — Z01.818 ENCOUNTER FOR OTHER PREPROCEDURAL EXAMINATION: ICD-10-CM

## 2022-02-04 DIAGNOSIS — I10 ESSENTIAL (PRIMARY) HYPERTENSION: ICD-10-CM

## 2022-02-04 LAB
ANION GAP SERPL CALCULATED.3IONS-SCNC: 12 MMOL/L (ref 5–18)
BUN SERPL-MCNC: 13 MG/DL (ref 8–22)
CALCIUM SERPL-MCNC: 9.9 MG/DL (ref 8.5–10.5)
CHLORIDE BLD-SCNC: 100 MMOL/L (ref 98–107)
CO2 SERPL-SCNC: 25 MMOL/L (ref 22–31)
CREAT SERPL-MCNC: 1.19 MG/DL (ref 0.7–1.3)
GFR SERPL CREATININE-BSD FRML MDRD: 67 ML/MIN/1.73M2
GLUCOSE BLD-MCNC: 196 MG/DL (ref 70–125)
POTASSIUM BLD-SCNC: 4.2 MMOL/L (ref 3.5–5)
SARS-COV-2 RNA RESP QL NAA+PROBE: NEGATIVE
SODIUM SERPL-SCNC: 137 MMOL/L (ref 136–145)

## 2022-02-04 PROCEDURE — U0003 INFECTIOUS AGENT DETECTION BY NUCLEIC ACID (DNA OR RNA); SEVERE ACUTE RESPIRATORY SYNDROME CORONAVIRUS 2 (SARS-COV-2) (CORONAVIRUS DISEASE [COVID-19]), AMPLIFIED PROBE TECHNIQUE, MAKING USE OF HIGH THROUGHPUT TECHNOLOGIES AS DESCRIBED BY CMS-2020-01-R: HCPCS | Mod: ORL | Performed by: FAMILY MEDICINE

## 2022-02-04 PROCEDURE — 80048 BASIC METABOLIC PNL TOTAL CA: CPT | Mod: ORL | Performed by: FAMILY MEDICINE

## 2022-05-21 ENCOUNTER — HEALTH MAINTENANCE LETTER (OUTPATIENT)
Age: 67
End: 2022-05-21

## 2022-09-17 ENCOUNTER — HEALTH MAINTENANCE LETTER (OUTPATIENT)
Age: 67
End: 2022-09-17

## 2023-06-04 ENCOUNTER — HEALTH MAINTENANCE LETTER (OUTPATIENT)
Age: 68
End: 2023-06-04

## 2023-10-19 ENCOUNTER — LAB REQUISITION (OUTPATIENT)
Dept: LAB | Facility: CLINIC | Age: 68
End: 2023-10-19

## 2023-10-19 DIAGNOSIS — E78.5 HYPERLIPIDEMIA, UNSPECIFIED: ICD-10-CM

## 2023-10-19 DIAGNOSIS — E11.42 TYPE 2 DIABETES MELLITUS WITH DIABETIC POLYNEUROPATHY (H): ICD-10-CM

## 2023-10-19 DIAGNOSIS — Z12.5 ENCOUNTER FOR SCREENING FOR MALIGNANT NEOPLASM OF PROSTATE: ICD-10-CM

## 2023-10-19 DIAGNOSIS — I10 ESSENTIAL (PRIMARY) HYPERTENSION: ICD-10-CM

## 2023-10-19 LAB
ANION GAP SERPL CALCULATED.3IONS-SCNC: 15 MMOL/L (ref 7–15)
BUN SERPL-MCNC: 10.1 MG/DL (ref 8–23)
CALCIUM SERPL-MCNC: 9.4 MG/DL (ref 8.8–10.2)
CHLORIDE SERPL-SCNC: 100 MMOL/L (ref 98–107)
CHOLEST SERPL-MCNC: 99 MG/DL
CREAT SERPL-MCNC: 1.17 MG/DL (ref 0.67–1.17)
DEPRECATED HCO3 PLAS-SCNC: 22 MMOL/L (ref 22–29)
EGFRCR SERPLBLD CKD-EPI 2021: 67 ML/MIN/1.73M2
GLUCOSE SERPL-MCNC: 125 MG/DL (ref 70–99)
HBA1C MFR BLD: 7.2 %
HDLC SERPL-MCNC: 42 MG/DL
LDLC SERPL CALC-MCNC: 41 MG/DL
NONHDLC SERPL-MCNC: 57 MG/DL
POTASSIUM SERPL-SCNC: 4.7 MMOL/L (ref 3.4–5.3)
PSA SERPL DL<=0.01 NG/ML-MCNC: 1.56 NG/ML (ref 0–4.5)
SODIUM SERPL-SCNC: 137 MMOL/L (ref 135–145)
TRIGL SERPL-MCNC: 80 MG/DL

## 2023-10-19 PROCEDURE — 80048 BASIC METABOLIC PNL TOTAL CA: CPT | Performed by: FAMILY MEDICINE

## 2023-10-19 PROCEDURE — 83036 HEMOGLOBIN GLYCOSYLATED A1C: CPT | Performed by: FAMILY MEDICINE

## 2023-10-19 PROCEDURE — G0103 PSA SCREENING: HCPCS | Performed by: FAMILY MEDICINE

## 2023-10-19 PROCEDURE — 80061 LIPID PANEL: CPT | Performed by: FAMILY MEDICINE

## 2024-02-24 ENCOUNTER — HEALTH MAINTENANCE LETTER (OUTPATIENT)
Age: 69
End: 2024-02-24

## (undated) RX ORDER — TRIAMCINOLONE ACETONIDE 40 MG/ML
INJECTION, SUSPENSION INTRA-ARTICULAR; INTRAMUSCULAR
Status: DISPENSED
Start: 2020-02-27

## (undated) RX ORDER — TRIAMCINOLONE ACETONIDE 40 MG/ML
INJECTION, SUSPENSION INTRA-ARTICULAR; INTRAMUSCULAR
Status: DISPENSED
Start: 2021-03-15

## (undated) RX ORDER — LIDOCAINE HYDROCHLORIDE 10 MG/ML
INJECTION, SOLUTION INFILTRATION; PERINEURAL
Status: DISPENSED
Start: 2017-11-09

## (undated) RX ORDER — LIDOCAINE HYDROCHLORIDE 10 MG/ML
INJECTION, SOLUTION EPIDURAL; INFILTRATION; INTRACAUDAL; PERINEURAL
Status: DISPENSED
Start: 2021-03-15

## (undated) RX ORDER — TRIAMCINOLONE ACETONIDE 40 MG/ML
INJECTION, SUSPENSION INTRA-ARTICULAR; INTRAMUSCULAR
Status: DISPENSED
Start: 2017-11-09

## (undated) RX ORDER — LIDOCAINE HYDROCHLORIDE 10 MG/ML
INJECTION, SOLUTION EPIDURAL; INFILTRATION; INTRACAUDAL; PERINEURAL
Status: DISPENSED
Start: 2020-02-27